# Patient Record
Sex: MALE | URBAN - METROPOLITAN AREA
[De-identification: names, ages, dates, MRNs, and addresses within clinical notes are randomized per-mention and may not be internally consistent; named-entity substitution may affect disease eponyms.]

---

## 2020-01-21 ENCOUNTER — IMPORTED ENCOUNTER (OUTPATIENT)
Dept: URBAN - METROPOLITAN AREA CLINIC 9 | Facility: CLINIC | Age: 30
End: 2020-01-21

## 2020-01-30 ENCOUNTER — IMPORTED ENCOUNTER (OUTPATIENT)
Dept: URBAN - METROPOLITAN AREA CLINIC 9 | Facility: CLINIC | Age: 30
End: 2020-01-30

## 2020-02-10 ENCOUNTER — IMPORTED ENCOUNTER (OUTPATIENT)
Dept: URBAN - METROPOLITAN AREA CLINIC 9 | Facility: CLINIC | Age: 30
End: 2020-02-10

## 2020-02-18 ENCOUNTER — IMPORTED ENCOUNTER (OUTPATIENT)
Dept: URBAN - METROPOLITAN AREA CLINIC 9 | Facility: CLINIC | Age: 30
End: 2020-02-18

## 2020-02-27 ENCOUNTER — IMPORTED ENCOUNTER (OUTPATIENT)
Dept: URBAN - METROPOLITAN AREA CLINIC 9 | Facility: CLINIC | Age: 30
End: 2020-02-27

## 2020-06-11 ENCOUNTER — IMPORTED ENCOUNTER (OUTPATIENT)
Dept: URBAN - METROPOLITAN AREA CLINIC 9 | Facility: CLINIC | Age: 30
End: 2020-06-11

## 2021-10-16 ASSESSMENT — VISUAL ACUITY
OD_CC: 20/40 SN
OD_PH: 20/70 -2 SN
OD_CC: 20/20 -2 SN
OD_SC: 20/25 SN
OS_CC: 20/30 -2 SN
OS_PH: 20/40 - SN
OS_SC: 20/20 SN
OS_SC: 20/50 - SN
OD_SC: 20/100 - SN
OD_SC: 20/30 SN
OS_SC: 20/60 SN
OS_SC: 20/25 SN
OS_CC: 20/20 - SN
OD_SC: 20/20 SN
OD_SC: 20/20 - SN
OS_SC: 20/25 - SN

## 2021-10-16 ASSESSMENT — TONOMETRY
OD_IOP_MMHG: 15
OD_IOP_MMHG: 18
OS_IOP_MMHG: 11
OD_IOP_MMHG: 18
OS_IOP_MMHG: 18
OS_IOP_MMHG: 13
OD_IOP_MMHG: 16
OS_IOP_MMHG: 15
OS_IOP_MMHG: 13
OD_IOP_MMHG: 14

## 2021-12-09 NOTE — PROCEDURE NOTE: CLINICAL
PROCEDURE NOTE: Excision of Eyelid Lesion Bilateral Upper Lids. Diagnosis: Eyelid Papilloma. Anesthesia: Topical. Prep: Betadine Flush. Prior to treatment, the risks/benefits/alternatives were discussed. The patient wished to proceed with procedure. Local anesthetic was given. The eyelid was prepped and draped for procedure. The lesion was incised and removed. The wound was repaired with sutures. Patient tolerated procedure well. There were no complications. Post procedure instructions given. Shubham White